# Patient Record
Sex: FEMALE | Race: BLACK OR AFRICAN AMERICAN | NOT HISPANIC OR LATINO | URBAN - METROPOLITAN AREA
[De-identification: names, ages, dates, MRNs, and addresses within clinical notes are randomized per-mention and may not be internally consistent; named-entity substitution may affect disease eponyms.]

---

## 2024-01-01 ENCOUNTER — HOSPITAL ENCOUNTER (EMERGENCY)
Facility: HOSPITAL | Age: 59
End: 2024-05-06
Attending: EMERGENCY MEDICINE
Payer: COMMERCIAL

## 2024-01-01 DIAGNOSIS — I46.9 CARDIAC ARREST (HCC): Primary | ICD-10-CM

## 2024-01-01 PROCEDURE — 92950 HEART/LUNG RESUSCITATION CPR: CPT

## 2024-01-01 PROCEDURE — 92950 HEART/LUNG RESUSCITATION CPR: CPT | Performed by: EMERGENCY MEDICINE

## 2024-01-01 PROCEDURE — 99285 EMERGENCY DEPT VISIT HI MDM: CPT | Performed by: EMERGENCY MEDICINE

## 2024-01-01 PROCEDURE — 99284 EMERGENCY DEPT VISIT MOD MDM: CPT

## 2024-05-06 NOTE — ED ATTENDING ATTESTATION
2024  I, Kerwin Chaney MD, saw and evaluated the patient. I have discussed the patient with the resident/non-physician practitioner and agree with the resident's/non-physician practitioner's findings, Plan of Care, and MDM as documented in the resident's/non-physician practitioner's note, except where noted. All available labs and Radiology studies were reviewed.  I was present for key portions of any procedure(s) performed by the resident/non-physician practitioner and I was immediately available to provide assistance.       At this point I agree with the current assessment done in the Emergency Department.  I have conducted an independent evaluation of this patient a history and physical is as follows:  EMS was called for cardiac arrest been getting history from the paramedic involved.  Apparently when the police got there she was already in asystole.  She was on the AED with no shock advised.  CPR was commenced and she has now been given 5 rounds of epinephrine with continued CPR and still in asystole.  Patient was placed in the room she had already been intubated and had the Kevin device.  She had breath sounds bilaterally.  We turned off the Kevin she was in asystole and we had an ultrasound in the room where we did not see any cardiac activity.  Since the downtime was roughly over an hour we decided to call at.  I did that and go speak with the family to let them know that this was going to be a 's case.  She recently had surgery at [facility] could see the nohemi in her head from what appeared to be a meningioma.  Unclear etiology of the patient's demise.  ED Course  ED Course as of 24 1431   Mon May 06, 2024   1338 I updated the family that the patient had .  Unclear cause of death at this point.  Case presented to the corner and it appears that they will be evaluate for possible autopsy.         Critical Care Time  Procedures

## 2024-05-06 NOTE — ED PROVIDER NOTES
History  Chief Complaint   Patient presents with    Cardiac Arrest     Last seen [phone], found in bed, apd started cpr on scene, estimated downtime 1 1/2 hr, given 5 epi, IO left leg, 7 tube      HPI    57 y/o F presenting as cardiac arrest. Per EMS, pt seen last around 1130am, found in bed by family unresponsive, police arrived, cpr initiated, AED placed not recommending shock. EMS arrived, noted in asystole, IO placed, 5 rounds of epi given, remains in asystole on arrival. Intubated with 7.0 tube in the field.     None       No past medical history on file.    No past surgical history on file.    No family history on file.  I have reviewed and agree with the history as documented.    No existing history information found.  No existing history information found.        Review of Systems   Unable to perform ROS: Acuity of condition       Physical Exam  ED Triage Vitals   Temp Pulse Resp BP SpO2   -- -- -- -- --      Temp src Heart Rate Source Patient Position - Orthostatic VS BP Location FiO2 (%)   -- -- -- -- --      Pain Score       --             Orthostatic Vital Signs  Vitals:       Physical Exam  Constitutional:       Appearance: She is obese. She is ill-appearing.   HENT:      Head: Normocephalic.      Comments: Pt has skin staples along anterior hairline, no signs of bleeding or infection, appears well healing     Right Ear: External ear normal.      Left Ear: External ear normal.      Mouth/Throat:      Comments: ETT in place  Eyes:      Comments: R eye bulging, scleral injection, pupils non reactive b/l   Cardiovascular:      Comments: CPR in progress, no spontaneous cardiac activity, no spontaneous circulation   Pulmonary:      Comments: Breath sounds present b/l with bagging through ETT  Abdominal:      General: There is no distension.   Musculoskeletal:      Right lower leg: No edema.      Left lower leg: No edema.   Neurological:      Comments: GCS 3T         ED Medications  Medications - No data to  display    Diagnostic Studies  Results Reviewed       None                   No orders to display         Procedures  Procedures      ED Course  ED Course as of 24 1549   Mon May 06, 2024   1319 TOD 1251                                       Medical Decision Making  57 y/o F presenting with cardiac arrest from home, maximal down time close to 1.5 hours. Pt in asystole throughout EMS to ED care despite chest compressions, intubation, multiple rounds of epi. Given long down time, continued asystole, no cardiac activity on bedside u/s, decision made to call code. Unclear etiology of arrest, possible pe vs acs vs dissection vs intracranial process given recent meningioma surgery (per chart review). Family updated.  contacted for possible 's case.           Disposition  Final diagnoses:   Cardiac arrest (HCC)     Time reflects when diagnosis was documented in both MDM as applicable and the Disposition within this note       Time User Action Codes Description Comment    2024  1:19 PM Kerwin Chanye [I46.9] Cardiac arrest (HCC)           ED Disposition       ED Disposition       Condition   --    Date/Time   Mon May 6, 2024  1:12 PM    Comment   --             Follow-up Information    None       Date, Time and Cause of Death    Date of Death: 24  Time of Death: 12:51 PM  Preliminary Cause of Death: Sudden cardiac death (HCC)  Entered by: Kerwin Chaney MD[CS1.1]       Attribution       CS1.1 Kerwin Chaney MD 24 13:21          Patient's Medications    No medications on file     No discharge procedures on file.    PDMP Review       None             ED Provider  Attending physically available and evaluated Sandy Rothman. I managed the patient along with the ED Attending.    Electronically Signed by           Liam Samuels MD  [phone]